# Patient Record
Sex: MALE | Race: WHITE
[De-identification: names, ages, dates, MRNs, and addresses within clinical notes are randomized per-mention and may not be internally consistent; named-entity substitution may affect disease eponyms.]

---

## 2020-01-08 ENCOUNTER — HOSPITAL ENCOUNTER (OUTPATIENT)
Dept: HOSPITAL 35 - CAT | Age: 70
End: 2020-01-08
Attending: FAMILY MEDICINE
Payer: COMMERCIAL

## 2020-01-08 DIAGNOSIS — Z13.6: Primary | ICD-10-CM

## 2020-01-08 DIAGNOSIS — E78.00: ICD-10-CM

## 2020-01-08 DIAGNOSIS — I25.10: ICD-10-CM

## 2020-04-08 ENCOUNTER — HOSPITAL ENCOUNTER (OUTPATIENT)
Dept: HOSPITAL 35 - SJCVC | Age: 70
End: 2020-04-08
Attending: INTERNAL MEDICINE
Payer: COMMERCIAL

## 2020-04-08 DIAGNOSIS — I45.10: Primary | ICD-10-CM

## 2020-04-08 DIAGNOSIS — R94.31: ICD-10-CM

## 2020-04-08 DIAGNOSIS — E78.5: ICD-10-CM

## 2020-04-08 DIAGNOSIS — I25.10: ICD-10-CM

## 2020-04-08 DIAGNOSIS — R09.89: ICD-10-CM

## 2020-04-08 DIAGNOSIS — Z79.899: ICD-10-CM

## 2020-04-08 DIAGNOSIS — I10: ICD-10-CM

## 2020-05-28 ENCOUNTER — HOSPITAL ENCOUNTER (OUTPATIENT)
Dept: HOSPITAL 35 - SJCVCIMAG | Age: 70
End: 2020-05-28
Attending: INTERNAL MEDICINE
Payer: COMMERCIAL

## 2020-05-28 DIAGNOSIS — R06.09: ICD-10-CM

## 2020-05-28 DIAGNOSIS — I65.23: Primary | ICD-10-CM

## 2020-05-28 DIAGNOSIS — I73.9: ICD-10-CM

## 2020-05-28 DIAGNOSIS — R93.1: ICD-10-CM

## 2020-06-02 ENCOUNTER — HOSPITAL ENCOUNTER (OUTPATIENT)
Dept: HOSPITAL 35 - GI | Age: 70
Discharge: HOME | End: 2020-06-02
Attending: INTERNAL MEDICINE
Payer: COMMERCIAL

## 2020-06-02 VITALS — WEIGHT: 170 LBS | BODY MASS INDEX: 26.68 KG/M2 | HEIGHT: 67.01 IN

## 2020-06-02 DIAGNOSIS — K63.89: ICD-10-CM

## 2020-06-02 DIAGNOSIS — E03.9: ICD-10-CM

## 2020-06-02 DIAGNOSIS — E78.5: ICD-10-CM

## 2020-06-02 DIAGNOSIS — D12.3: ICD-10-CM

## 2020-06-02 DIAGNOSIS — Z79.899: ICD-10-CM

## 2020-06-02 DIAGNOSIS — Z12.11: Primary | ICD-10-CM

## 2020-06-02 DIAGNOSIS — Z98.890: ICD-10-CM

## 2020-06-02 DIAGNOSIS — Z86.010: ICD-10-CM

## 2020-06-02 DIAGNOSIS — K64.8: ICD-10-CM

## 2020-06-02 DIAGNOSIS — Z79.82: ICD-10-CM

## 2020-06-02 DIAGNOSIS — Z11.59: ICD-10-CM

## 2020-06-02 PROCEDURE — 62900: CPT

## 2020-06-02 PROCEDURE — 62110: CPT

## 2020-06-04 NOTE — PATH
[facility]
[address]
[place]   [address]                     PATHOLOGY RPT PROCEDURE       
_______________________________________________________________________________
 
Name:       CLARK RAPHAEL        Room #:                     REG CL 
M.R.#:      [id number]     Account #:      [id number]  
Admission:  06/02/20    Date of Birth:  [birth year]  
Discharge:                                              Report #:    [id number]
                                                        Path Case #: [id number]
_______________________________________________________________________________
 
[facility] Accession Number: [id number]
.                                                                01
Material submitted:                                        .
PART A: hepatic flexure - POLYP AT HEPAITC FLEXURE
PART B: splenic flexure - POLYP AT SPLENIC FLEXURE X2
PART C: colon - POLYP AT SIGMOID COLON. Modifiers: sigmoid
.                                                                01
Clinical history:                                          .
hx of polyps
.                                                                02
**********************************************************************
Diagnosis:
A. Large bowel "polyp at hepatic flexure", endoscopic biopsy:
   - Polypoid fragment of large bowel mucosa with prominent intramucosal
     lymphoid aggregate.
   - Negative for high-grade dysplasia and malignancy.
.
B. Large bowel "polyp at splenic flexure", endoscopic biopsy:
   - Tubular adenoma; negative for high-grade dysplasia and malignancy.
.
C. Large bowel "polyp at sigmoid colon", endoscopic biopsy:
   - Polypoid fragment of large bowel mucosa with prominent intramucosal
      lymphoid aggregate.
   - Negative for dysplasia or malignancy.
(MLK:pit; 06/03/2020)
QTP  06/04/2020  1326 Local
**********************************************************************
.                                                                02
Electronically signed:                                     .
Simone Holliday MD, Pathologist
NPI- [id number]
.                                                                01
Gross description:                                         .
A.  The specimen is received in formalin labeled "Clark Raphael, polyp
at hepatic flexure" and consists of multiple fragments of tan tissue
measuring 0.4 x 0.3 x 0.2 cm in aggregate which are entirely submitted in
A1.
.
B.  The specimen is received in formalin labeled "Clark Raphael, polyp
at splenic flexure x2" and consists of multiple fragments of tan tissue
measuring 0.9 x 0.9 x 0.3 cm in aggregate which are entirely submitted in
B1.
.
C.  The specimen is received in formalin labeled "Clark Raphael, polyp
at sigmoid colon" and consists of a fragment of tan tissue measuring 0.3 x
0.3 x 0.2 cm which is entirely submitted in C1.
([facility]; 6/2/2020)
 
 
[place] [address]                     PATHOLOGY RPT PROCEDURE       
_______________________________________________________________________________
 
Name:       CLARK RAPHAEL [place]        Room #:                     REG CLI 
CORNELIA#:      [id number]     Account #:      [id number]  
Admission:  06/02/20    Date of Birth:  [birth year]  
Discharge:                                              Report #:    [id number]
                                                        Path Case #: [id number]
_______________________________________________________________________________
JFQ/JFQ  06/02/2020  1826 Local
.                                                                02
Pathologist provided ICD-10:
D12.3, Z86.010
.                                                                02
CPT                                                        .
350037, 482285, [id number]
Specimen Comment: A courtesy copy of this report has been sent to [phone]
[phone]
Specimen Comment: [phone]
Specimen Comment: Report sent to  / DR KOWALSKI
***Performed at:  01
   [facility]
   [address] [place] [address]  [id number]
   MD Price Larsen MD Phone:  [phone]
***Performed at:  02
   [address]  [id number]
   MD Spencer Kerley MD Phone:  [phone]

## 2021-06-03 ENCOUNTER — HOSPITAL ENCOUNTER (OUTPATIENT)
Dept: HOSPITAL 35 - SJCVC | Age: 71
End: 2021-06-03
Attending: INTERNAL MEDICINE
Payer: COMMERCIAL

## 2021-06-03 DIAGNOSIS — I21.9: ICD-10-CM

## 2021-06-03 DIAGNOSIS — I10: ICD-10-CM

## 2021-06-03 DIAGNOSIS — E78.5: ICD-10-CM

## 2021-06-03 DIAGNOSIS — I45.10: ICD-10-CM

## 2021-06-03 DIAGNOSIS — I77.9: ICD-10-CM

## 2021-06-03 DIAGNOSIS — Z79.82: ICD-10-CM

## 2021-06-03 DIAGNOSIS — Z79.899: ICD-10-CM

## 2021-06-03 DIAGNOSIS — I25.10: ICD-10-CM

## 2021-06-03 DIAGNOSIS — R94.31: Primary | ICD-10-CM

## 2021-06-03 DIAGNOSIS — R93.1: ICD-10-CM
